# Patient Record
Sex: MALE
[De-identification: names, ages, dates, MRNs, and addresses within clinical notes are randomized per-mention and may not be internally consistent; named-entity substitution may affect disease eponyms.]

---

## 2020-05-15 PROBLEM — Z00.00 ENCOUNTER FOR PREVENTIVE HEALTH EXAMINATION: Status: ACTIVE | Noted: 2020-05-15

## 2020-05-18 ENCOUNTER — APPOINTMENT (OUTPATIENT)
Dept: UROLOGY | Facility: CLINIC | Age: 65
End: 2020-05-18
Payer: MEDICARE

## 2020-05-18 VITALS
HEART RATE: 78 BPM | TEMPERATURE: 97 F | BODY MASS INDEX: 24.91 KG/M2 | OXYGEN SATURATION: 97 % | SYSTOLIC BLOOD PRESSURE: 112 MMHG | WEIGHT: 174 LBS | DIASTOLIC BLOOD PRESSURE: 69 MMHG | HEIGHT: 70 IN

## 2020-05-18 DIAGNOSIS — Z78.9 OTHER SPECIFIED HEALTH STATUS: ICD-10-CM

## 2020-05-18 DIAGNOSIS — K42.9 UMBILICAL HERNIA W/OUT OBSTRUCTION OR GANGRENE: ICD-10-CM

## 2020-05-18 DIAGNOSIS — Z80.0 FAMILY HISTORY OF MALIGNANT NEOPLASM OF DIGESTIVE ORGANS: ICD-10-CM

## 2020-05-18 DIAGNOSIS — C41.2 MALIGNANT NEOPLASM OF VERTEBRAL COLUMN: ICD-10-CM

## 2020-05-18 DIAGNOSIS — N43.3 HYDROCELE, UNSPECIFIED: ICD-10-CM

## 2020-05-18 DIAGNOSIS — I10 ESSENTIAL (PRIMARY) HYPERTENSION: ICD-10-CM

## 2020-05-18 DIAGNOSIS — K59.09 OTHER CONSTIPATION: ICD-10-CM

## 2020-05-18 PROCEDURE — 99204 OFFICE O/P NEW MOD 45 MIN: CPT

## 2020-05-18 RX ORDER — SENNOSIDES/DOCUSATE SODIUM 8.6MG-50MG
8.6-5 TABLET ORAL
Refills: 0 | Status: ACTIVE | COMMUNITY

## 2020-05-18 RX ORDER — FINASTERIDE 5 MG/1
5 TABLET, FILM COATED ORAL
Refills: 0 | Status: ACTIVE | COMMUNITY

## 2020-05-18 RX ORDER — TAMSULOSIN HYDROCHLORIDE 0.4 MG/1
0.4 CAPSULE ORAL
Refills: 0 | Status: ACTIVE | COMMUNITY

## 2020-05-18 RX ORDER — METOPROLOL TARTRATE 100 MG/1
100 TABLET, FILM COATED ORAL
Refills: 0 | Status: ACTIVE | COMMUNITY

## 2020-05-18 RX ORDER — HYDRALAZINE HYDROCHLORIDE 25 MG/1
25 TABLET ORAL
Refills: 0 | Status: ACTIVE | COMMUNITY

## 2020-05-18 NOTE — ASSESSMENT
[FreeTextEntry1] : I discussed the findings and options with Mr. NELLIE SALMERON and his cousin in detail. Since the current catheter was replaced several days ago, there is no need for to change this today.   I advised that he use Bacitracin on the meatus tid.  Because of the persistent high volume retention and known neurological history I recommended a urodynamic evaluation and I have referred him to Dr. Fuentes for this.  I explained the procedure and its purpose in detail and answered multiple questions.\par \par They understand that the current PSA is inaccurate since it was taken during the urinary retention.  Since there are no available prior results, this can be addressed in the future.  In view of the indwelling Corrales catheter, the positive urine culture was not treated.  A dose of an antibiotic may be considered prior to the urodynamics, however. \par

## 2020-05-18 NOTE — PHYSICAL EXAM
[General Appearance - Well Developed] : well developed [General Appearance - Well Nourished] : well nourished [General Appearance - In No Acute Distress] : no acute distress [Normal Appearance] : normal appearance [Heart Rate And Rhythm] : Heart rate and rhythm were normal [Edema] : no peripheral edema [Respiration, Rhythm And Depth] : normal respiratory rhythm and effort [Exaggerated Use Of Accessory Muscles For Inspiration] : no accessory muscle use [Abdomen Mass (___ Cm)] : no abdominal mass palpated [Abdomen Soft] : soft [Abdomen Tenderness] : non-tender [Costovertebral Angle Tenderness] : no ~M costovertebral angle tenderness [Urethral Meatus] : meatus normal [Penis Abnormality] : normal circumcised penis [Epididymis] : the epididymides were normal [Urinary Bladder Findings] : the bladder was normal on palpation [Scrotum] : the scrotum was normal [Testes Tenderness] : no tenderness of the testes [Prostate Tenderness] : the prostate was not tender [Prostate Enlargement] : the prostate was not enlarged [Testes Mass (___cm)] : there were no testicular masses [No Prostate Nodules] : no prostate nodules [Normal Station and Gait] : the gait and station were normal for the patient's age [] : no rash [Skin Color & Pigmentation] : normal skin color and pigmentation [Oriented To Time, Place, And Person] : oriented to person, place, and time [No Focal Deficits] : no focal deficits [Not Anxious] : not anxious [No Palpable Adenopathy] : no palpable adenopathy [FreeTextEntry1] : Corrales catheter in place.  No evidence of infection.  Small left hydrocele. Scrotal angiokeratomas

## 2020-05-18 NOTE — HISTORY OF PRESENT ILLNESS
[FreeTextEntry1] : Mr. NELLIE ANDERSON comes in today for a urologic evaluation with his first cousin who takes care of him.  He went into urinary retention on April 12th 2020 (draining 1.5L urine). At that time he was started on Flomax 0.4mg bid and Proscar.  Since then he failed two voiding trials.   Mr. Anderson denies any significant prior recent voiding symptoms.  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 Postoperatively he went into retention.  Currently, he walks without a cane but also uses a wheelchair. He is disabled for reasons that are not clear. \par \par Mr. Anderson is not sexually active.\par \par PSA:   5/18/20-8.2ng/ml (no prior tests available)\par Creat:  5/18/20--0.8mg/dl\par Uc+s:  5/12/20-->100,000 Staph aureus; 4/13/20--Neg\par \par

## 2020-05-18 NOTE — LETTER BODY
[Dear  ___] : Dear  [unfilled], [Please see my note below.] : Please see my note below. [Consult Closing:] : Thank you very much for allowing me to participate in the care of this patient.  If you have any questions, please do not hesitate to contact me. [Sincerely,] : Sincerely, [FreeTextEntry3] : Venu Joyner MD, FACS\par

## 2020-05-22 ENCOUNTER — APPOINTMENT (OUTPATIENT)
Dept: UROLOGY | Facility: CLINIC | Age: 65
End: 2020-05-22
Payer: MEDICARE

## 2020-05-22 DIAGNOSIS — R33.9 RETENTION OF URINE, UNSPECIFIED: ICD-10-CM

## 2020-05-22 PROCEDURE — 51784 ANAL/URINARY MUSCLE STUDY: CPT

## 2020-05-22 PROCEDURE — 51741 ELECTRO-UROFLOWMETRY FIRST: CPT

## 2020-05-22 PROCEDURE — 51728 CYSTOMETROGRAM W/VP: CPT

## 2020-05-22 PROCEDURE — 51797 INTRAABDOMINAL PRESSURE TEST: CPT
